# Patient Record
Sex: FEMALE | Race: WHITE
[De-identification: names, ages, dates, MRNs, and addresses within clinical notes are randomized per-mention and may not be internally consistent; named-entity substitution may affect disease eponyms.]

---

## 2019-09-24 ENCOUNTER — HOSPITAL ENCOUNTER (OUTPATIENT)
Dept: HOSPITAL 92 - SDC | Age: 64
End: 2019-09-24
Attending: OTOLARYNGOLOGY
Payer: MEDICARE

## 2019-09-24 DIAGNOSIS — Z95.810: ICD-10-CM

## 2019-09-24 DIAGNOSIS — I50.9: ICD-10-CM

## 2019-09-24 DIAGNOSIS — C85.11: Primary | ICD-10-CM

## 2019-09-24 DIAGNOSIS — E66.9: ICD-10-CM

## 2019-09-24 LAB
ANION GAP SERPL CALC-SCNC: 15 MMOL/L (ref 10–20)
BUN SERPL-MCNC: 19 MG/DL (ref 9.8–20.1)
CALCIUM SERPL-MCNC: 9.5 MG/DL (ref 7.8–10.44)
CHLORIDE SERPL-SCNC: 98 MMOL/L (ref 98–107)
CO2 SERPL-SCNC: 20 MMOL/L (ref 23–31)
CREAT CL PREDICTED SERPL C-G-VRATE: 0 ML/MIN (ref 70–130)
GLUCOSE SERPL-MCNC: 172 MG/DL (ref 80–115)
HGB BLD-MCNC: 12.1 G/DL (ref 12–16)
POTASSIUM SERPL-SCNC: 4.1 MMOL/L (ref 3.5–5.1)
SODIUM SERPL-SCNC: 129 MMOL/L (ref 136–145)

## 2019-09-24 PROCEDURE — 36415 COLL VENOUS BLD VENIPUNCTURE: CPT

## 2019-09-24 PROCEDURE — 88184 FLOWCYTOMETRY/ TC 1 MARKER: CPT

## 2019-09-24 PROCEDURE — 80048 BASIC METABOLIC PNL TOTAL CA: CPT

## 2019-09-24 PROCEDURE — 88307 TISSUE EXAM BY PATHOLOGIST: CPT

## 2019-09-24 PROCEDURE — 93005 ELECTROCARDIOGRAM TRACING: CPT

## 2019-09-24 PROCEDURE — 93010 ELECTROCARDIOGRAM REPORT: CPT

## 2019-09-24 PROCEDURE — 85018 HEMOGLOBIN: CPT

## 2019-09-24 PROCEDURE — 88342 IMHCHEM/IMCYTCHM 1ST ANTB: CPT

## 2019-09-24 PROCEDURE — 0JB40ZZ EXCISION OF RIGHT NECK SUBCUTANEOUS TISSUE AND FASCIA, OPEN APPROACH: ICD-10-PCS | Performed by: OTOLARYNGOLOGY

## 2019-09-24 PROCEDURE — 88341 IMHCHEM/IMCYTCHM EA ADD ANTB: CPT

## 2019-09-24 PROCEDURE — 88360 TUMOR IMMUNOHISTOCHEM/MANUAL: CPT

## 2019-09-24 PROCEDURE — 85014 HEMATOCRIT: CPT

## 2019-09-25 NOTE — OP
DATE OF PROCEDURE:  09/24/2019



PREOPERATIVE DIAGNOSIS:  Disseminated lymphadenopathy with right large level 5

cervical lymphadenopathy. 



POSTOPERATIVE DIAGNOSIS:  Disseminated lymphadenopathy with right large level 5

cervical lymphadenopathy. 



INDICATIONS FOR PROCEDURE:  The patient is a 64-year-old patient, presenting with

mediastinal and cervical lymphadenopathy concerning for malignant or neoplastic

process, and right excisional neck mass for indications of discomfort as well as for

diagnostic purposes.  The patient was seen preoperatively, and a consent form was

signed, and a copy of the consent form is located in the paper chart.  The consent

form discussed potential for numbness, nerve injury, potentially right neck and

muscle weakness given location of mass as well as bleeding, infection, and scarring,

and further operative procedures. 



DESCRIPTION OF PROCEDURE:  The patient was taken back to the operative room and laid

supine on the operative room table.  An oxygen mask was used, and the patient was

made comfortable under managed anesthesia care.  Next, the right neck was cleansed

with alcohol swabs, and the right neck was injected around the mass in a field block

with 1% lidocaine to 1:100,000 epinephrine for local anesthesia.  Next, the patient

was prepped and draped in a sterile fashion, and the operative site was evaluated,

and a right level 5 neck mass was noted.  A small 3.5 cm incision was made over the

center of the midline mass in the right neck crease, and dissection was carried down

through the dermis into the subcutaneous tissue.  Next, blunt dissection with

Metzenbaum scissors and bipolar cautery was used to identify the mass.  Running just

over the mass and around on the anterior portion was a large-caliber nerve

consistent in the position of the spinal accessory nerve at the posterior aspect of

the sternocleidomastoid.  This nerve was preserved and gently bluntly  from

the enlarged lymph node mass and with gentle retraction was moved out of the away

with a combination of bipolar cautery, blunt dissection, and sharp dissection with

Metzenbaum and tenotomy scissors.  The right neck mass was removed and sent fresh

for lymphoma studies for pathology evaluation.  Next, the wound was irrigated

thoroughly, and the level 5 neck was explored for any further masses located in that

region of the neck or any potential bleeding.  No other masses were noted or

identified.  No other concerning malignant processes were identified.  No invasion

into musculature, either sternocleidomastoid or trapezius muscle.  No obvious nerve

invasion.  At that point, the wound was closed in layers with 3-0 Vicryl deep suture

with buried interrupted sutures.  Next, the skin was closed with 5-0 chromic gut,

and then, small Steri-Strips were placed over the wound.  No drain was placed.

Blood loss was estimated to be 5 mL during the procedure.  Specimen, right level 5

neck mass was sent fresh for lymphoma studies.  No implants were used.  No

complications were present during the entire surgery.  The patient was then handed

over to Anesthesia for emergence.  The patient was alert and conscious during the

entire procedure and accompanied by the surgeon to the postoperative anesthesia care

unit, and postoperative care was discussed with the patient. 







Job ID:  [id number]

## 2019-09-29 NOTE — EKG
Test Reason : PREOP

Blood Pressure : ***/*** mmHG

Vent. Rate : 069 BPM     Atrial Rate : 069 BPM

   P-R Int : 162 ms          QRS Dur : 168 ms

    QT Int : 486 ms       P-R-T Axes : 081 -21 093 degrees

   QTc Int : 520 ms

 

Electronic ventricular pacemaker

No previous ECGs available

Confirmed by FREDY BRADY (2) on 9/29/2019 4:56:03 PM

 

Referred By:  SNOW           Confirmed By:FREDY BRADY